# Patient Record
Sex: FEMALE | Race: WHITE
[De-identification: names, ages, dates, MRNs, and addresses within clinical notes are randomized per-mention and may not be internally consistent; named-entity substitution may affect disease eponyms.]

---

## 2019-12-10 ENCOUNTER — APPOINTMENT (OUTPATIENT)
Dept: ORTHOPEDIC SURGERY | Facility: CLINIC | Age: 34
End: 2019-12-10

## 2020-02-06 ENCOUNTER — APPOINTMENT (OUTPATIENT)
Dept: ORTHOPEDIC SURGERY | Facility: CLINIC | Age: 35
End: 2020-02-06
Payer: COMMERCIAL

## 2020-02-06 VITALS — WEIGHT: 114 LBS | HEIGHT: 62 IN | BODY MASS INDEX: 20.98 KG/M2

## 2020-02-06 DIAGNOSIS — Z78.9 OTHER SPECIFIED HEALTH STATUS: ICD-10-CM

## 2020-02-06 DIAGNOSIS — Z80.9 FAMILY HISTORY OF MALIGNANT NEOPLASM, UNSPECIFIED: ICD-10-CM

## 2020-02-06 PROBLEM — Z00.00 ENCOUNTER FOR PREVENTIVE HEALTH EXAMINATION: Status: ACTIVE | Noted: 2020-02-06

## 2020-02-06 PROCEDURE — 99204 OFFICE O/P NEW MOD 45 MIN: CPT

## 2020-02-06 PROCEDURE — 73590 X-RAY EXAM OF LOWER LEG: CPT | Mod: RT

## 2020-02-06 RX ORDER — NAPROXEN 500 MG/1
500 TABLET ORAL
Qty: 30 | Refills: 2 | Status: ACTIVE | COMMUNITY
Start: 2020-02-06 | End: 1900-01-01

## 2020-02-06 NOTE — HISTORY OF PRESENT ILLNESS
[de-identified] : Location: right leg\par Quality:  dull\par Duration: 6 months\par Context:  atraumatic\par Aggravating Factors: lying down , rom, walking \par Conservative treatment:  rest\par Associated Symptoms:  stiffness\par Prior Studies:n.a\par

## 2020-02-06 NOTE — DISCUSSION/SUMMARY
[Medication Risks Reviewed] : Medication risks reviewed [de-identified] : She may have some inflammation of the muscle. Within her try an anti-inflammatory. This will be for 2 weeks. If the symptoms don't resolve and it persists she will call and we would do an MRI of her right leg. Follow up as needed

## 2020-02-06 NOTE — REVIEW OF SYSTEMS
[Arthralgia] : arthralgia [Joint Stiffness] : no joint stiffness [Joint Pain] : no joint pain [Joint Swelling] : no joint swelling

## 2020-02-06 NOTE — PHYSICAL EXAM
[Normal] : Gait: normal [de-identified] : Right leg shows no warmth or swelling and no tenderness. Normal neurologic exam with reflexes sensation and motor strength appears she can balance on her right foot she can perform single stance heel raises she can hop on her right leg there is no pain. [de-identified] : Right tibia/fibula x-ray shows no evidence of fracture or dislocation.

## 2020-08-24 ENCOUNTER — TRANSCRIPTION ENCOUNTER (OUTPATIENT)
Age: 35
End: 2020-08-24

## 2020-08-27 ENCOUNTER — APPOINTMENT (OUTPATIENT)
Dept: ORTHOPEDIC SURGERY | Facility: CLINIC | Age: 35
End: 2020-08-27

## 2021-06-16 ENCOUNTER — APPOINTMENT (OUTPATIENT)
Dept: ORTHOPEDIC SURGERY | Facility: CLINIC | Age: 36
End: 2021-06-16

## 2021-06-23 ENCOUNTER — APPOINTMENT (OUTPATIENT)
Dept: ORTHOPEDIC SURGERY | Facility: CLINIC | Age: 36
End: 2021-06-23
Payer: COMMERCIAL

## 2021-06-23 DIAGNOSIS — M79.661 PAIN IN RIGHT LOWER LEG: ICD-10-CM

## 2021-06-23 PROCEDURE — 99072 ADDL SUPL MATRL&STAF TM PHE: CPT

## 2021-06-23 PROCEDURE — 99213 OFFICE O/P EST LOW 20 MIN: CPT

## 2021-06-23 NOTE — DISCUSSION/SUMMARY
[de-identified] : She has had recurrent symptoms. She can have an MRI done of her right leg to rule out a stress fracture or stress reaction. I will call her with the results. She is leaving the country in one [place] so hopefully we can get the test done before she goes away.

## 2021-06-23 NOTE — HISTORY OF PRESENT ILLNESS
[de-identified] : She was last seen in February 2020 with a similar episode of night away with medication and time she is now at 6 weeks if symptoms again possibly related to increased activity level. It doesn't wake her up at night. No numbness or tingling. She's not noted any swelling. Again it is in the right mid part of the tibia [Pain Location] : pain [Stable] : stable

## 2021-06-23 NOTE — PHYSICAL EXAM
[Normal] : Gait: normal [de-identified] : Right lower leg shows no warmth or swelling. There is some mild discomfort along the posterior medial aspect of the tibia. Her range of motion and knee and ankle is normal. Neurovascular exam is normal.

## 2021-06-24 RX ORDER — NAPROXEN 500 MG/1
500 TABLET ORAL
Qty: 60 | Refills: 2 | Status: ACTIVE | COMMUNITY
Start: 2021-06-24 | End: 1900-01-01